# Patient Record
Sex: MALE | Race: WHITE | NOT HISPANIC OR LATINO | Employment: FULL TIME | URBAN - METROPOLITAN AREA
[De-identification: names, ages, dates, MRNs, and addresses within clinical notes are randomized per-mention and may not be internally consistent; named-entity substitution may affect disease eponyms.]

---

## 2017-04-21 ENCOUNTER — TRANSCRIBE ORDERS (OUTPATIENT)
Dept: PHYSICAL THERAPY | Facility: CLINIC | Age: 57
End: 2017-04-21

## 2017-04-21 DIAGNOSIS — IMO0001: Primary | ICD-10-CM

## 2017-04-26 ENCOUNTER — TREATMENT (OUTPATIENT)
Dept: PHYSICAL THERAPY | Facility: CLINIC | Age: 57
End: 2017-04-26

## 2017-04-26 PROCEDURE — 97110 THERAPEUTIC EXERCISES: CPT | Performed by: PHYSICAL THERAPIST

## 2017-04-26 PROCEDURE — 97001 PR PHYS THERAPY EVALUATION: CPT | Performed by: PHYSICAL THERAPIST

## 2017-04-26 PROCEDURE — 97140 MANUAL THERAPY 1/> REGIONS: CPT | Performed by: PHYSICAL THERAPIST

## 2017-04-26 PROCEDURE — A9999 DME SUPPLY OR ACCESSORY, NOS: HCPCS | Performed by: PHYSICAL THERAPIST

## 2017-04-26 NOTE — PROGRESS NOTES
Physical Therapy Initial Evaluation and Plan of Care    Total time: 45 minutes    Subjective Evaluation    History of Present Illness  Onset date: several weeks ago.  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[employer]        Pain  Current pain ratin  At best pain ratin  At worst pain ratin  Location: left long finger PIP   Relieving factors: medications  Aggravating factors: movement  Progression: improved    Patient Goals  Patient goals for therapy: decreased pain, increased strength, return to sport/leisure activities, return to work, independence with ADLs/IADLs and increased motion             Objective     Tenderness     Additional Tenderness Details  MILD TTP LEFT LONG FINGER MCP AND PIP     Active Range of Motion     Additional Active Range of Motion Details  ROM OF WRIST, HAND AND FINGERS ALL WNL    Strength/Myotome Testing     Left Wrist/Hand   Normal wrist strength    Right Wrist/Hand   Normal wrist strength         Assessment & Plan     Assessment  Impairments: activity intolerance, impaired physical strength, lacks appropriate home exercise program and pain with function  Assessment details: S/s consistent with tendonitis/strain of left long finger; pain with functional gripping and pulling exercises as well as sporadic pain; good ROM and only minimally decreased  strength; should respond well to HEP and will check back with PT prior to MD f/u next week  Prognosis: fair  Prognosis details:   GOALS: 2 WEEKS  1. INDEPENDENT WITH HEP  2. PAIN 0/10 AND NO COMPLAINTS OF TRIGGER FINGER  3. ABLE TO PUSH, PULL,  WITHOUT PAIN    Plan  Therapy options: will be seen for skilled physical therapy services  Planned modality interventions: ultrasound, thermotherapy (paraffin bath), thermotherapy (hydrocollator packs), TENS, cryotherapy, electrical stimulation/Russian stimulation, high voltage pulsed current (pain management) and iontophoresis  Planned  therapy interventions: joint mobilization, home exercise program, functional ROM exercises, flexibility, manual therapy, soft tissue mobilization, strengthening, stretching and therapeutic activities  Frequency: 1x week  Duration in weeks: 2  Treatment plan discussed with: patient        Manual Therapy:    15     mins  31942;  Therapeutic Exercise:    15     mins  54204;     Neuromuscular Edward:        mins  86912;    Therapeutic Activity:          mins  29900;     Gait Training:           mins  36230;     Ultrasound:          mins  50373;    Electrical Stimulation:         mins  00279 ( );  Dry Needling          mins self-pay    Timed Treatment:   30   mins   Total Treatment:     45   mins    PT SIGNATURE: Talib Luevano, SUJEY   DATE TREATMENT INITIATED: 4/26/2017    Initial Certification  Certification Period: 7/25/2017  I certify that the therapy services are furnished while this patient is under my care.  The services outlined above are required by this patient, and will be reviewed every 90 days.     PHYSICIAN:       DATE:     Please sign and return via fax to  .. Thank you, [facility].

## 2020-08-01 PROCEDURE — U0003 INFECTIOUS AGENT DETECTION BY NUCLEIC ACID (DNA OR RNA); SEVERE ACUTE RESPIRATORY SYNDROME CORONAVIRUS 2 (SARS-COV-2) (CORONAVIRUS DISEASE [COVID-19]), AMPLIFIED PROBE TECHNIQUE, MAKING USE OF HIGH THROUGHPUT TECHNOLOGIES AS DESCRIBED BY CMS-2020-01-R: HCPCS | Performed by: NURSE PRACTITIONER

## 2020-08-04 ENCOUNTER — TELEPHONE (OUTPATIENT)
Dept: URGENT CARE | Facility: CLINIC | Age: 60
End: 2020-08-04

## 2020-08-04 NOTE — TELEPHONE ENCOUNTER
----- Message from Nathen Woody MD sent at 8/4/2020  9:21 AM EDT -----  COVID is not detected.  Please notify the patient.-Nathen Woody M.D.      ----- Message -----  From: Lab, Background User  Sent: 8/3/2020   8:08 PM EDT  To: [facility] Covid Results    Spoke with Pt informed lab result was not detected. Pt verbalized understanding stated he was not symptomatic only exposed no further questions

## 2021-06-09 ENCOUNTER — HOSPITAL ENCOUNTER (EMERGENCY)
Facility: HOSPITAL | Age: 61
Discharge: ANOTHER HOSPITAL | End: 2021-06-10
Attending: EMERGENCY MEDICINE | Admitting: EMERGENCY MEDICINE

## 2021-06-09 DIAGNOSIS — R39.198 DIFFICULTY URINATING: ICD-10-CM

## 2021-06-09 DIAGNOSIS — J98.2 PNEUMOMEDIASTINUM (HCC): ICD-10-CM

## 2021-06-09 DIAGNOSIS — R10.30 LOWER ABDOMINAL PAIN: Primary | ICD-10-CM

## 2021-06-09 PROCEDURE — 99284 EMERGENCY DEPT VISIT MOD MDM: CPT

## 2021-06-10 ENCOUNTER — APPOINTMENT (OUTPATIENT)
Dept: CT IMAGING | Facility: HOSPITAL | Age: 61
End: 2021-06-10

## 2021-06-10 VITALS
WEIGHT: 170 LBS | TEMPERATURE: 98.8 F | SYSTOLIC BLOOD PRESSURE: 153 MMHG | HEIGHT: 68 IN | OXYGEN SATURATION: 97 % | HEART RATE: 72 BPM | BODY MASS INDEX: 25.76 KG/M2 | DIASTOLIC BLOOD PRESSURE: 82 MMHG | RESPIRATION RATE: 18 BRPM

## 2021-06-10 LAB
ALBUMIN SERPL-MCNC: 3.9 G/DL (ref 3.5–5.2)
ALBUMIN/GLOB SERPL: 2.2 G/DL
ALP SERPL-CCNC: 68 U/L (ref 39–117)
ALT SERPL W P-5'-P-CCNC: 21 U/L (ref 1–41)
ANION GAP SERPL CALCULATED.3IONS-SCNC: 7 MMOL/L (ref 5–15)
AST SERPL-CCNC: 21 U/L (ref 1–40)
BASOPHILS # BLD AUTO: 0.02 10*3/MM3 (ref 0–0.2)
BASOPHILS NFR BLD AUTO: 0.4 % (ref 0–1.5)
BILIRUB SERPL-MCNC: 0.4 MG/DL (ref 0–1.2)
BUN SERPL-MCNC: 13 MG/DL (ref 8–23)
BUN/CREAT SERPL: 11.6 (ref 7–25)
CALCIUM SPEC-SCNC: 9 MG/DL (ref 8.6–10.5)
CHLORIDE SERPL-SCNC: 102 MMOL/L (ref 98–107)
CO2 SERPL-SCNC: 28 MMOL/L (ref 22–29)
CREAT SERPL-MCNC: 1.12 MG/DL (ref 0.76–1.27)
DEPRECATED RDW RBC AUTO: 39.6 FL (ref 37–54)
EOSINOPHIL # BLD AUTO: 0.05 10*3/MM3 (ref 0–0.4)
EOSINOPHIL NFR BLD AUTO: 0.9 % (ref 0.3–6.2)
ERYTHROCYTE [DISTWIDTH] IN BLOOD BY AUTOMATED COUNT: 11.7 % (ref 12.3–15.4)
GFR SERPL CREATININE-BSD FRML MDRD: 67 ML/MIN/1.73
GLOBULIN UR ELPH-MCNC: 1.8 GM/DL
GLUCOSE SERPL-MCNC: 147 MG/DL (ref 65–99)
HCT VFR BLD AUTO: 40.7 % (ref 37.5–51)
HGB BLD-MCNC: 13.6 G/DL (ref 13–17.7)
IMM GRANULOCYTES # BLD AUTO: 0.04 10*3/MM3 (ref 0–0.05)
IMM GRANULOCYTES NFR BLD AUTO: 0.1 % (ref 0–0.5)
LYMPHOCYTES # BLD AUTO: 0.54 10*3/MM3 (ref 0.7–3.1)
LYMPHOCYTES NFR BLD AUTO: 9.6 % (ref 19.6–45.3)
MCH RBC QN AUTO: 30.8 PG (ref 26.6–33)
MCHC RBC AUTO-ENTMCNC: 33.4 G/DL (ref 31.5–35.7)
MCV RBC AUTO: 92.3 FL (ref 79–97)
MONOCYTES # BLD AUTO: 0.38 10*3/MM3 (ref 0.1–0.9)
MONOCYTES NFR BLD AUTO: 6.8 % (ref 5–12)
NEUTROPHILS NFR BLD AUTO: 4.57 10*3/MM3 (ref 1.7–7)
NEUTROPHILS NFR BLD AUTO: 81.6 % (ref 42.7–76)
NRBC BLD AUTO-RTO: 0 /100 WBC (ref 0–0.2)
PLATELET # BLD AUTO: 158 10*3/MM3 (ref 140–450)
PMV BLD AUTO: 10.3 FL (ref 6–12)
POTASSIUM SERPL-SCNC: 4 MMOL/L (ref 3.5–5.2)
PROT SERPL-MCNC: 5.7 G/DL (ref 6–8.5)
RBC # BLD AUTO: 4.41 10*6/MM3 (ref 4.14–5.8)
SODIUM SERPL-SCNC: 137 MMOL/L (ref 136–145)
WBC # BLD AUTO: 5.6 10*3/MM3 (ref 3.4–10.8)

## 2021-06-10 PROCEDURE — 85025 COMPLETE CBC W/AUTO DIFF WBC: CPT | Performed by: PHYSICIAN ASSISTANT

## 2021-06-10 PROCEDURE — 80053 COMPREHEN METABOLIC PANEL: CPT | Performed by: PHYSICIAN ASSISTANT

## 2021-06-10 PROCEDURE — 74176 CT ABD & PELVIS W/O CONTRAST: CPT

## 2021-06-10 RX ORDER — HYDROCODONE BITARTRATE AND ACETAMINOPHEN 5; 325 MG/1; MG/1
1 TABLET ORAL ONCE
Status: COMPLETED | OUTPATIENT
Start: 2021-06-10 | End: 2021-06-10

## 2021-06-10 RX ADMIN — HYDROCODONE BITARTRATE AND ACETAMINOPHEN 1 TABLET: 5; 325 TABLET ORAL at 04:22

## 2021-06-10 NOTE — ED PROVIDER NOTES
Subjective   Patient is a 61-year-old male presents emergency room for evaluation of difficulty with urination following surgery today.  Patient had a bilateral inguinal hernia repair surgery performed by Dr. Ye with [facility].  Patient states that his surgery went well and he was discharged home at approximately 4:30 PM today.  He states that upon returning home he tried to urinate but only could produce a weak stream.  He states again later after returning home again had difficulty with urination.  He shares that he has drank about 40 to 48 ounces of water and continues to have difficulty with urination.  Patient states he contacted the on-call surgeon who recommended he present to ED for further evaluation.  Upon assessment in the ED patient was able to void.  No additional associated symptoms on exam.  He does report some pain at the site of his recent surgery.          Review of Systems   Constitutional: Negative for chills and fever.   HENT: Negative.    Respiratory: Negative.    Cardiovascular: Negative.    Gastrointestinal: Positive for abdominal pain. Negative for constipation, diarrhea, nausea and vomiting.   Genitourinary: Positive for decreased urine volume and difficulty urinating.   All other systems reviewed and are negative.      Past Medical History:   Diagnosis Date   • History of transfusion    • Seasonal allergies        Allergies   Allergen Reactions   • Erythromycin GI Intolerance       Past Surgical History:   Procedure Laterality Date   • CHOLECYSTECTOMY     • HERNIA REPAIR  06/09/2021       History reviewed. No pertinent family history.    Social History     Socioeconomic History   • Marital status:      Spouse name: Not on file   • Number of children: Not on file   • Years of education: Not on file   • Highest education level: Not on file   Tobacco Use   • Smoking status: Never Smoker   Vaping Use   • Vaping Use: Never used   Substance and Sexual Activity   • Alcohol use:  Yes     Alcohol/week: 3.0 - 4.0 standard drinks     Types: 3 - 4 Cans of beer per week   • Drug use: No   • Sexual activity: Defer           Objective   Physical Exam  Vitals and nursing note reviewed.   Constitutional:       General: He is not in acute distress.     Appearance: Normal appearance. He is not ill-appearing or toxic-appearing.   HENT:      Head: Normocephalic and atraumatic.      Nose: Nose normal.      Mouth/Throat:      Mouth: Mucous membranes are moist.   Eyes:      Extraocular Movements: Extraocular movements intact.      Conjunctiva/sclera: Conjunctivae normal.   Cardiovascular:      Rate and Rhythm: Normal rate.   Pulmonary:      Effort: Pulmonary effort is normal. No respiratory distress.   Abdominal:      Palpations: Abdomen is soft.      Tenderness: There is abdominal tenderness.      Comments: Diffuse tenderness at site of recent surgery and right and left inguinal area.  No signs or symptoms of infection, no abnormal warmth or erythema appreciated   Musculoskeletal:         General: Normal range of motion.      Cervical back: Normal range of motion.   Skin:     General: Skin is warm and dry.   Neurological:      General: No focal deficit present.      Mental Status: He is alert.   Psychiatric:         Mood and Affect: Mood normal.         Behavior: Behavior normal.         Thought Content: Thought content normal.         Judgment: Judgment normal.         Procedures           ED Course  ED Course as of Jun 16 0342   Thu Kip 10, 2021   0247 Spoke with [facility] hospitalist Dr. Dimitri Jackson who was agreeable to accept the patient for transfer to observation unit inpatient telemetry.    [JG]   7411 Notified by bed control at [facility] that they do not currently have any beds available for the transfer.  Patient will likely be in ED to ED transfer but this cannot happen until 7 AM.  There is a possibility patient will get a bed or be transferred to [facility] in the morning.     [JG]    0530 Patient presents ED with complaints of difficulty with urination following surgical procedure earlier today.  No acute or emergent findings demonstrated on physical exam.  Labs without acute or emergent findings.  Patient able to void while in the emergency department on patient's imaging there was no evidence of urinary tract obstruction, a nondistended bladder.  Patient with a small amount of postoperative hematoma within the left inguinal canal.  Of more concern was the fact that patient had extensive postoperative air in the pelvic and abdominal wall and mid and upper retroperitoneum.  In addition patient was found to have a thoracic pneumomediastinum.  Dr. Santacruz with general surgery at [facility] was contacted through the [facility] as he was on-call for Dr. Ye.  His recommendation was for patient to be transferred for further evaluation and observation by the surgeon.  Access center was contacted and [facility] hospitalist Dr. Shirley was agreeable to accept patient for transfer.  Unfortunately there were no beds available at the time of transfer disposition.  There was a possibility of an ER to ER transfer at approximately 7 AM but this could not happen earlier in the evening as the [facility] ER was short staff and did not have adequate nursing to care for the patient.  This information was exchanged with the patient and his wife at bedside.    [JG]   Wed Jun 16, 2021   0341 Patient was transferred to [facility] for further evaluation by his surgeon.     [JG]      ED Course User Index  [JG] Hugo Dooley, PA      No results found for this or any previous visit (from the past 24 hour(s)).  Note: In addition to lab results from this visit, the labs listed above may include labs taken at another facility or during a different encounter within the last 24 hours. Please correlate lab times with ED admission and discharge times for further clarification of the  services performed during this visit.    CT Abdomen Pelvis Without Contrast   Final Result   1.  Postop bilateral inguinal herniorrhaphy with extensive postoperative air in the pelvic and abdominal body wall, and mid and upper retroperitoneum. There is a small lower thoracic pneumomediastinum. See details above.   2.  No recurrent inguinal hernia. Small amount of postoperative hematoma within the left inguinal canal.   3.  Urinary bladder is nondistended. No evidence of upper urinary tract obstruction.      Signer Name: Nick Lynch MD    Signed: 6/10/2021 1:15 AM    Workstation Name: [id number]     [facility]        Vitals:    06/10/21 0530 06/10/21 0630 06/10/21 0738 06/10/21 0739   BP: 145/88 156/82 153/82 153/82   Pulse:   72 72   Resp:   18 18   Temp:   98.8 °F (37.1 °C) 98.8 °F (37.1 °C)   TempSrc:       SpO2: 97%  97% 97%   Weight:       Height:         Medications   HYDROcodone-acetaminophen (NORCO) 5-325 MG per tablet 1 tablet (1 tablet Oral Given 6/10/21 0422)     ECG/EMG Results (last 24 hours)     ** No results found for the last 24 hours. **        No orders to display                                          MDM  Number of Diagnoses or Management Options  Difficulty urinating: new and requires workup  Lower abdominal pain: new and requires workup  Pneumomediastinum (CMS/HCC): new and requires workup     Amount and/or Complexity of Data Reviewed  Clinical lab tests: reviewed  Tests in the radiology section of CPT®: reviewed    Risk of Complications, Morbidity, and/or Mortality  Presenting problems: moderate  Diagnostic procedures: moderate  Management options: moderate    Patient Progress  Patient progress: stable      Final diagnoses:   Lower abdominal pain   Pneumomediastinum (CMS/HCC)   Difficulty urinating       ED Disposition  ED Disposition     ED Disposition Condition Comment    Transfer to Another Facility             No follow-up provider specified.        Medication List      No changes were made to your prescriptions during this visit.          Hugo Dooley, PA  06/16/21 0348